# Patient Record
Sex: FEMALE | Race: WHITE | Employment: FULL TIME | URBAN - METROPOLITAN AREA
[De-identification: names, ages, dates, MRNs, and addresses within clinical notes are randomized per-mention and may not be internally consistent; named-entity substitution may affect disease eponyms.]

---

## 2019-07-03 ENCOUNTER — OFFICE VISIT (OUTPATIENT)
Dept: INTERNAL MEDICINE | Age: 49
End: 2019-07-03
Payer: COMMERCIAL

## 2019-07-03 VITALS
SYSTOLIC BLOOD PRESSURE: 110 MMHG | OXYGEN SATURATION: 98 % | HEIGHT: 65 IN | DIASTOLIC BLOOD PRESSURE: 68 MMHG | BODY MASS INDEX: 27.56 KG/M2 | WEIGHT: 165.4 LBS | HEART RATE: 74 BPM | TEMPERATURE: 98.4 F

## 2019-07-03 DIAGNOSIS — J06.9 URI, ACUTE: ICD-10-CM

## 2019-07-03 DIAGNOSIS — F17.200 SMOKER: ICD-10-CM

## 2019-07-03 DIAGNOSIS — Z13.1 SCREENING FOR DIABETES MELLITUS: ICD-10-CM

## 2019-07-03 DIAGNOSIS — E03.9 HYPOTHYROIDISM, UNSPECIFIED TYPE: Primary | ICD-10-CM

## 2019-07-03 DIAGNOSIS — Z12.11 COLON CANCER SCREENING: ICD-10-CM

## 2019-07-03 DIAGNOSIS — Z13.220 SCREENING, LIPID: ICD-10-CM

## 2019-07-03 DIAGNOSIS — Z13.31 POSITIVE DEPRESSION SCREENING: ICD-10-CM

## 2019-07-03 PROBLEM — F32.A DEPRESSION: Status: ACTIVE | Noted: 2017-01-19

## 2019-07-03 LAB — HBA1C MFR BLD: 5.7 %

## 2019-07-03 PROCEDURE — G8431 POS CLIN DEPRES SCRN F/U DOC: HCPCS | Performed by: PHYSICIAN ASSISTANT

## 2019-07-03 PROCEDURE — 83036 HEMOGLOBIN GLYCOSYLATED A1C: CPT | Performed by: PHYSICIAN ASSISTANT

## 2019-07-03 PROCEDURE — 99203 OFFICE O/P NEW LOW 30 MIN: CPT | Performed by: PHYSICIAN ASSISTANT

## 2019-07-03 PROCEDURE — G0444 DEPRESSION SCREEN ANNUAL: HCPCS | Performed by: PHYSICIAN ASSISTANT

## 2019-07-03 RX ORDER — AZITHROMYCIN 250 MG/1
TABLET, FILM COATED ORAL
Qty: 1 PACKET | Refills: 0 | Status: SHIPPED | OUTPATIENT
Start: 2019-07-03 | End: 2019-08-12 | Stop reason: ALTCHOICE

## 2019-07-03 RX ORDER — LEVOTHYROXINE SODIUM 0.1 MG/1
100 TABLET ORAL DAILY
Qty: 30 TABLET | Refills: 0 | Status: SHIPPED | OUTPATIENT
Start: 2019-07-03 | End: 2019-07-31 | Stop reason: SDUPTHER

## 2019-07-03 RX ORDER — ASPIRIN 81 MG/1
81 TABLET ORAL
COMMUNITY
Start: 2015-12-29

## 2019-07-03 RX ORDER — LEVOTHYROXINE AND LIOTHYRONINE 38; 9 UG/1; UG/1
60 TABLET ORAL DAILY
Qty: 30 TABLET | Refills: 0 | Status: SHIPPED | OUTPATIENT
Start: 2019-07-03 | End: 2019-08-12 | Stop reason: ALTCHOICE

## 2019-07-03 RX ORDER — NICOTINE 21 MG/24HR
1 PATCH, TRANSDERMAL 24 HOURS TRANSDERMAL
COMMUNITY
Start: 2017-02-02 | End: 2019-08-12 | Stop reason: ALTCHOICE

## 2019-07-03 RX ORDER — LEVOTHYROXINE AND LIOTHYRONINE 38; 9 UG/1; UG/1
60 TABLET ORAL
COMMUNITY
Start: 2018-02-26 | End: 2019-07-03 | Stop reason: SDUPTHER

## 2019-07-03 RX ORDER — LEVOTHYROXINE SODIUM 0.1 MG/1
100 TABLET ORAL
COMMUNITY
Start: 2018-02-26 | End: 2019-07-03 | Stop reason: SDUPTHER

## 2019-07-03 ASSESSMENT — PATIENT HEALTH QUESTIONNAIRE - PHQ9
10. IF YOU CHECKED OFF ANY PROBLEMS, HOW DIFFICULT HAVE THESE PROBLEMS MADE IT FOR YOU TO DO YOUR WORK, TAKE CARE OF THINGS AT HOME, OR GET ALONG WITH OTHER PEOPLE: 1
7. TROUBLE CONCENTRATING ON THINGS, SUCH AS READING THE NEWSPAPER OR WATCHING TELEVISION: 1
6. FEELING BAD ABOUT YOURSELF - OR THAT YOU ARE A FAILURE OR HAVE LET YOURSELF OR YOUR FAMILY DOWN: 1
4. FEELING TIRED OR HAVING LITTLE ENERGY: 3
9. THOUGHTS THAT YOU WOULD BE BETTER OFF DEAD, OR OF HURTING YOURSELF: 0
SUM OF ALL RESPONSES TO PHQ QUESTIONS 1-9: 12
2. FEELING DOWN, DEPRESSED OR HOPELESS: 2
SUM OF ALL RESPONSES TO PHQ9 QUESTIONS 1 & 2: 3
3. TROUBLE FALLING OR STAYING ASLEEP: 2
SUM OF ALL RESPONSES TO PHQ QUESTIONS 1-9: 12
8. MOVING OR SPEAKING SO SLOWLY THAT OTHER PEOPLE COULD HAVE NOTICED. OR THE OPPOSITE, BEING SO FIGETY OR RESTLESS THAT YOU HAVE BEEN MOVING AROUND A LOT MORE THAN USUAL: 0
1. LITTLE INTEREST OR PLEASURE IN DOING THINGS: 1
5. POOR APPETITE OR OVEREATING: 2

## 2019-07-03 ASSESSMENT — ENCOUNTER SYMPTOMS
EYE REDNESS: 0
SHORTNESS OF BREATH: 0
COUGH: 0
EYE PAIN: 0

## 2019-07-03 NOTE — PROGRESS NOTES
m).    Weight as of this encounter: 165 lb 6.4 oz (75 kg). Physical Exam   Constitutional: She is oriented to person, place, and time. She appears well-developed and well-nourished. HENT:   Head: Normocephalic and atraumatic. Nose: Nose normal.   Mouth/Throat: Oropharynx is clear and moist.   Eyes: Pupils are equal, round, and reactive to light. Conjunctivae and EOM are normal.   Neck: Normal range of motion. Neck supple. No thyromegaly present. Cardiovascular: Normal rate, regular rhythm and normal heart sounds. Pulmonary/Chest: Effort normal and breath sounds normal. She has no wheezes. She has no rales. Abdominal: Soft. Bowel sounds are normal.   Musculoskeletal: Normal range of motion. Lymphadenopathy:     She has no cervical adenopathy. Neurological: She is alert and oriented to person, place, and time. Vitals reviewed. Results for POC orders placed in visit on 07/03/19   POCT glycosylated hemoglobin (Hb A1C)   Result Value Ref Range    Hemoglobin A1C 5.7 %         ASSESSMENT/PLAN:  1. Hypothyroidism, unspecified type  - levothyroxine (SYNTHROID) 100 MCG tablet; Take 1 tablet by mouth Daily  Dispense: 30 tablet; Refill: 0  - thyroid (ARMOUR THYROID) 60 MG tablet; Take 1 tablet by mouth daily  Dispense: 30 tablet; Refill: 0  - CBC Auto Differential; Future  - Comprehensive Metabolic Panel; Future  - TSH Without Reflex; Future  - T4, Free; Future    2. URI, acute  - will treat due to length of illness   - azithromycin (ZITHROMAX) 250 MG tablet; Take 2 tabs (500 mg) on Day 1, and take 1 tab (250 mg) on days 2 through 5. Dispense: 1 packet; Refill: 0    3. Positive depression screening  - Positive Screen for Clinical Depression with a Documented Follow-up Plan  - pt does not want meds right now, she thinsk that mood is due to being off her thyroid meds for 5 months     4. Screening for diabetes mellitus  - POCT glycosylated hemoglobin (Hb A1C)- normal at 5.7     5.  Colon cancer screening  -

## 2019-07-08 PROBLEM — F17.200 SMOKER: Status: ACTIVE | Noted: 2019-07-08

## 2019-07-22 ENCOUNTER — OFFICE VISIT (OUTPATIENT)
Dept: INTERNAL MEDICINE | Age: 49
End: 2019-07-22
Payer: COMMERCIAL

## 2019-07-22 VITALS
OXYGEN SATURATION: 98 % | HEIGHT: 65 IN | TEMPERATURE: 98.9 F | WEIGHT: 165.6 LBS | SYSTOLIC BLOOD PRESSURE: 130 MMHG | HEART RATE: 76 BPM | DIASTOLIC BLOOD PRESSURE: 78 MMHG | BODY MASS INDEX: 27.59 KG/M2

## 2019-07-22 DIAGNOSIS — Z80.1 FH: LUNG CANCER: ICD-10-CM

## 2019-07-22 DIAGNOSIS — R91.8 MULTIPLE LUNG NODULES ON CT: Primary | ICD-10-CM

## 2019-07-22 DIAGNOSIS — F17.200 SMOKER: ICD-10-CM

## 2019-07-22 PROCEDURE — 99214 OFFICE O/P EST MOD 30 MIN: CPT | Performed by: PHYSICIAN ASSISTANT

## 2019-07-22 ASSESSMENT — ENCOUNTER SYMPTOMS
WHEEZING: 0
SHORTNESS OF BREATH: 0
COUGH: 0
APNEA: 0
CHEST TIGHTNESS: 0
STRIDOR: 0

## 2019-07-22 NOTE — PROGRESS NOTES
encounter: 5' 5\" (1.651 m). Weight as of this encounter: 165 lb 9.6 oz (75.1 kg). Physical Exam   Constitutional: She appears well-developed and well-nourished. HENT:   Head: Normocephalic and atraumatic. Nose: Nose normal.   Mouth/Throat: Oropharynx is clear and moist.   Eyes: Pupils are equal, round, and reactive to light. Conjunctivae and EOM are normal.   Neck: Normal range of motion. Neck supple. Cardiovascular: Normal rate, regular rhythm and normal heart sounds. Pulmonary/Chest: Effort normal and breath sounds normal. No stridor. No respiratory distress. She has no wheezes. She has no rales. Vitals reviewed. ASSESSMENT/PLAN:  1. Multiple lung nodules on CT  2. FH: lung cancer  3. Smoker  - reviewed CT and records with the patient  - strongly advised smoking cessation  - will f/u with serial CT scan of the lungs  - CT CHEST WO CONTRAST; Future      No follow-ups on file. An electronic signature was used to authenticate this note.     --GALDINO Enriquez on 7/22/2019 at 9:03 PM

## 2019-07-23 DIAGNOSIS — Z13.220 SCREENING, LIPID: ICD-10-CM

## 2019-07-23 DIAGNOSIS — E03.9 HYPOTHYROIDISM, UNSPECIFIED TYPE: ICD-10-CM

## 2019-07-23 LAB
ALBUMIN SERPL-MCNC: 4.2 G/DL (ref 3.5–4.6)
ALP BLD-CCNC: 125 U/L (ref 40–130)
ALT SERPL-CCNC: 16 U/L (ref 0–33)
ANION GAP SERPL CALCULATED.3IONS-SCNC: 12 MEQ/L (ref 9–15)
AST SERPL-CCNC: 17 U/L (ref 0–35)
BASOPHILS ABSOLUTE: 0.1 K/UL (ref 0–0.2)
BASOPHILS RELATIVE PERCENT: 1.1 %
BILIRUB SERPL-MCNC: 0.4 MG/DL (ref 0.2–0.7)
BUN BLDV-MCNC: 12 MG/DL (ref 6–20)
CALCIUM SERPL-MCNC: 9.2 MG/DL (ref 8.5–9.9)
CHLORIDE BLD-SCNC: 105 MEQ/L (ref 95–107)
CHOLESTEROL, TOTAL: 153 MG/DL (ref 0–199)
CO2: 24 MEQ/L (ref 20–31)
CREAT SERPL-MCNC: 0.85 MG/DL (ref 0.5–0.9)
EOSINOPHILS ABSOLUTE: 0.2 K/UL (ref 0–0.7)
EOSINOPHILS RELATIVE PERCENT: 3 %
GFR AFRICAN AMERICAN: >60
GFR NON-AFRICAN AMERICAN: >60
GLOBULIN: 2.5 G/DL (ref 2.3–3.5)
GLUCOSE BLD-MCNC: 100 MG/DL (ref 70–99)
HCT VFR BLD CALC: 42.7 % (ref 37–47)
HDLC SERPL-MCNC: 36 MG/DL (ref 40–59)
HEMOGLOBIN: 15.1 G/DL (ref 12–16)
LDL CHOLESTEROL CALCULATED: 96 MG/DL (ref 0–129)
LYMPHOCYTES ABSOLUTE: 1.7 K/UL (ref 1–4.8)
LYMPHOCYTES RELATIVE PERCENT: 27.3 %
MCH RBC QN AUTO: 33 PG (ref 27–31.3)
MCHC RBC AUTO-ENTMCNC: 35.3 % (ref 33–37)
MCV RBC AUTO: 93.5 FL (ref 82–100)
MONOCYTES ABSOLUTE: 0.4 K/UL (ref 0.2–0.8)
MONOCYTES RELATIVE PERCENT: 7 %
NEUTROPHILS ABSOLUTE: 3.9 K/UL (ref 1.4–6.5)
NEUTROPHILS RELATIVE PERCENT: 61.6 %
PDW BLD-RTO: 14.3 % (ref 11.5–14.5)
PLATELET # BLD: 343 K/UL (ref 130–400)
POTASSIUM SERPL-SCNC: 4.4 MEQ/L (ref 3.4–4.9)
RBC # BLD: 4.57 M/UL (ref 4.2–5.4)
SODIUM BLD-SCNC: 141 MEQ/L (ref 135–144)
T4 FREE: 2.08 NG/DL (ref 0.84–1.68)
TOTAL PROTEIN: 6.7 G/DL (ref 6.3–8)
TRIGL SERPL-MCNC: 104 MG/DL (ref 0–150)
TSH SERPL DL<=0.05 MIU/L-ACNC: 0.03 UIU/ML (ref 0.44–3.86)
WBC # BLD: 6.3 K/UL (ref 4.8–10.8)

## 2019-07-30 DIAGNOSIS — E03.9 HYPOTHYROIDISM, UNSPECIFIED TYPE: Primary | ICD-10-CM

## 2019-07-31 DIAGNOSIS — E03.9 HYPOTHYROIDISM, UNSPECIFIED TYPE: ICD-10-CM

## 2019-07-31 NOTE — TELEPHONE ENCOUNTER
[facility] requesting medication refill.  Please approve or deny this request.    Rx requested:  Requested Prescriptions     Pending Prescriptions Disp Refills    levothyroxine (SYNTHROID) 100 MCG tablet 45 tablet 0     Sig: Take 1 tablet by mouth Daily         Last Office Visit:   7/22/2019      Next Visit Date:  Future Appointments   Date Time Provider Fabián Cowart   8/12/2019  5:00 PM JAME Lau 98

## 2019-08-01 RX ORDER — LEVOTHYROXINE SODIUM 0.1 MG/1
100 TABLET ORAL DAILY
Qty: 90 TABLET | Refills: 3 | Status: SHIPPED | OUTPATIENT
Start: 2019-08-01 | End: 2019-12-03 | Stop reason: DRUGHIGH

## 2019-08-12 ENCOUNTER — OFFICE VISIT (OUTPATIENT)
Dept: INTERNAL MEDICINE | Age: 49
End: 2019-08-12
Payer: COMMERCIAL

## 2019-08-12 VITALS
HEART RATE: 69 BPM | HEIGHT: 65 IN | WEIGHT: 166 LBS | BODY MASS INDEX: 27.66 KG/M2 | SYSTOLIC BLOOD PRESSURE: 138 MMHG | OXYGEN SATURATION: 96 % | TEMPERATURE: 98.6 F | DIASTOLIC BLOOD PRESSURE: 80 MMHG

## 2019-08-12 DIAGNOSIS — E03.9 HYPOTHYROIDISM, UNSPECIFIED TYPE: Primary | ICD-10-CM

## 2019-08-12 PROCEDURE — 99213 OFFICE O/P EST LOW 20 MIN: CPT | Performed by: PHYSICIAN ASSISTANT

## 2019-08-12 RX ORDER — MELOXICAM 15 MG/1
15 TABLET ORAL
COMMUNITY
Start: 2019-08-06

## 2019-08-12 ASSESSMENT — ENCOUNTER SYMPTOMS: COUGH: 0

## 2019-11-27 DIAGNOSIS — E03.9 HYPOTHYROIDISM, UNSPECIFIED TYPE: ICD-10-CM

## 2019-11-27 LAB
T4 FREE: 2.22 NG/DL (ref 0.84–1.68)
TSH SERPL DL<=0.05 MIU/L-ACNC: 0.22 UIU/ML (ref 0.44–3.86)

## 2019-12-03 RX ORDER — LEVOTHYROXINE SODIUM 88 UG/1
88 TABLET ORAL DAILY
Qty: 90 TABLET | Refills: 0 | Status: SHIPPED | OUTPATIENT
Start: 2019-12-03 | End: 2020-03-20

## 2020-01-17 ENCOUNTER — OFFICE VISIT (OUTPATIENT)
Dept: INTERNAL MEDICINE | Age: 50
End: 2020-01-17
Payer: COMMERCIAL

## 2020-01-17 VITALS
OXYGEN SATURATION: 96 % | HEART RATE: 80 BPM | HEIGHT: 65 IN | WEIGHT: 160 LBS | DIASTOLIC BLOOD PRESSURE: 74 MMHG | TEMPERATURE: 98.9 F | BODY MASS INDEX: 26.66 KG/M2 | SYSTOLIC BLOOD PRESSURE: 124 MMHG

## 2020-01-17 PROCEDURE — 99214 OFFICE O/P EST MOD 30 MIN: CPT | Performed by: PHYSICIAN ASSISTANT

## 2020-01-17 ASSESSMENT — ENCOUNTER SYMPTOMS: COUGH: 0

## 2020-01-17 NOTE — PROGRESS NOTES
2020     Jamila Medina (:  1970) is a 47 y.o. female, here for evaluation of the following medical concerns:      Chief Complaint   Patient presents with    Insomnia     Pt states she is tired all the time, snores wakes herself up snoring. Wants to be checked for Sleep Apnea         HPI      Sleeping issues, with witnessed apnea   Tired all the time , does not feel rested in the am   Snored, and wakes herself with the snoring   BMI-  26.6   Neck size- 13.5 in  She is a smoker         Review of Systems   Constitutional: Positive for fatigue. Negative for chills and fever. Respiratory: Negative for cough. Cardiovascular: Negative for chest pain, palpitations and leg swelling. Prior to Visit Medications    Medication Sig Taking? Authorizing Provider   levothyroxine (SYNTHROID) 88 MCG tablet Take 1 tablet by mouth daily Yes GALDINO Hensley   meloxicam (MOBIC) 15 MG tablet Take 15 mg by mouth Yes Historical Provider, MD   L-Methylfolate-B6-B12 (VITACIRC-B PO) Take by mouth Yes Historical Provider, MD   Cholecalciferol 2000 units CAPS Take 1 capsule by mouth Yes Historical Provider, MD   aspirin (ECOTRIN LOW STRENGTH) 81 MG EC tablet Take 81 mg by mouth Yes Historical Provider, MD        Social History     Tobacco Use    Smoking status: Current Every Day Smoker     Packs/day: 1.00     Years: 35.00     Pack years: 35.00     Types: Cigarettes     Start date: 7/3/1984    Smokeless tobacco: Never Used   Substance Use Topics    Alcohol use: Not on file        Vitals:    20 1000   BP: 124/74   Site: Left Upper Arm   Position: Sitting   Cuff Size: Medium Adult   Pulse: 80   Temp: 98.9 °F (37.2 °C)   TempSrc: Temporal   SpO2: 96%   Weight: 160 lb (72.6 kg)   Height: 5' 5\" (1.651 m)     Estimated body mass index is 26.63 kg/m² as calculated from the following:    Height as of this encounter: 5' 5\" (1.651 m). Weight as of this encounter: 160 lb (72.6 kg).     Physical Exam  Vitals signs reviewed. Constitutional:       Appearance: Normal appearance. HENT:      Nose: Nose normal.      Mouth/Throat:      Mouth: Mucous membranes are moist.      Pharynx: Oropharynx is clear. Eyes:      Extraocular Movements: Extraocular movements intact. Conjunctiva/sclera: Conjunctivae normal.      Pupils: Pupils are equal, round, and reactive to light. Neck:      Musculoskeletal: Normal range of motion and neck supple. Cardiovascular:      Rate and Rhythm: Normal rate and regular rhythm. Heart sounds: Normal heart sounds. Pulmonary:      Effort: Pulmonary effort is normal.      Breath sounds: Normal breath sounds. Neurological:      Mental Status: She is alert. ASSESSMENT/PLAN:  1. Witnessed episode of apnea  - will get sleep study   - Home Sleep Study; Future    2. Colon cancer screening  - Amb External Referral To Gastroenterology    3. Smoker  - strongly advised cessation       No follow-ups on file. An electronic signature was used to authenticate this note.     --GALDINO Rodriguez on 1/21/2020 at 7:46 AM

## 2020-02-19 ENCOUNTER — TELEPHONE (OUTPATIENT)
Dept: INTERNAL MEDICINE | Age: 50
End: 2020-02-19

## 2020-02-19 PROBLEM — G47.33 OSA (OBSTRUCTIVE SLEEP APNEA): Status: ACTIVE | Noted: 2020-02-19

## 2020-03-30 ENCOUNTER — VIRTUAL VISIT (OUTPATIENT)
Dept: INTERNAL MEDICINE | Age: 50
End: 2020-03-30
Payer: COMMERCIAL

## 2020-03-30 PROCEDURE — 99203 OFFICE O/P NEW LOW 30 MIN: CPT | Performed by: PHYSICIAN ASSISTANT

## 2020-03-30 NOTE — PROGRESS NOTES
3/30/2020    TELEHEALTH EVALUATION -- Audio/Visual (During KDDBT-97 public health emergency)    Due to [place] 19 outbreak, patient's office visit was converted to a virtual visit. Patient was contacted and agreed to proceed with a virtual visit via [email or web address]  The risks and benefits of converting to a virtual visit were discussed in light of the current infectious disease epidemic. Patient also understood that insurance coverage and co-pays are up to their individual insurance plans. HPI:    Venkata Rizvis (:  1970) has requested an audio/video evaluation for the following concern(s):      Sleep apena , mild  On cpap , for one week  States she is using it at least 6 hrs a night   Feeling rested in the am, without daytime drowsiness           Review of Systems   Constitutional: Negative for activity change and fatigue. Cardiovascular: Negative for chest pain and leg swelling. Psychiatric/Behavioral: Negative for dysphoric mood and sleep disturbance. The patient is not nervous/anxious. Prior to Visit Medications    Medication Sig Taking?  Authorizing Provider   levothyroxine (SYNTHROID) 88 MCG tablet Take 1 tablet by mouth once daily Yes GALDINO Dumas   meloxicam (MOBIC) 15 MG tablet Take 15 mg by mouth Yes Historical Provider, MD   L-Methylfolate-B6-B12 (VITACIRC-B PO) Take by mouth Yes Historical Provider, MD   Cholecalciferol 2000 units CAPS Take 1 capsule by mouth Yes Historical Provider, MD   aspirin (ECOTRIN LOW STRENGTH) 81 MG EC tablet Take 81 mg by mouth Yes Historical Provider, MD       Social History     Tobacco Use    Smoking status: Current Every Day Smoker     Packs/day: 1.00     Years: 35.00     Pack years: 35.00     Types: Cigarettes     Start date: 7/3/1984    Smokeless tobacco: Never Used   Substance Use Topics    Alcohol use: Not on file    Drug use: Not on file        Allergies   Allergen Reactions    Amoxicillin-Pot Clavulanate Itching and Rash   , No past

## 2020-07-24 RX ORDER — LEVOTHYROXINE SODIUM 88 UG/1
TABLET ORAL
Qty: 90 TABLET | Refills: 1 | Status: SHIPPED | OUTPATIENT
Start: 2020-07-24

## 2020-07-24 NOTE — TELEPHONE ENCOUNTER
Requesting medication refill. Please approve or deny this request.    Rx requested:  Requested Prescriptions     Pending Prescriptions Disp Refills    levothyroxine (SYNTHROID) 88 MCG tablet 90 tablet 1     Sig: Take 1 tablet by mouth once daily       Last Office Visit:   1/17/2020  Vv3/30/2020  Last Filled:  3/21/2020    Last Labs:   7/1/2020    Next Visit Date:  No future appointments.